# Patient Record
Sex: FEMALE
[De-identification: names, ages, dates, MRNs, and addresses within clinical notes are randomized per-mention and may not be internally consistent; named-entity substitution may affect disease eponyms.]

---

## 2021-03-30 PROBLEM — Z00.00 ENCOUNTER FOR PREVENTIVE HEALTH EXAMINATION: Status: ACTIVE | Noted: 2021-03-30

## 2021-04-02 ENCOUNTER — APPOINTMENT (OUTPATIENT)
Dept: OTOLARYNGOLOGY | Facility: CLINIC | Age: 31
End: 2021-04-02
Payer: COMMERCIAL

## 2021-04-02 VITALS
BODY MASS INDEX: 21.97 KG/M2 | WEIGHT: 124 LBS | HEIGHT: 63 IN | OXYGEN SATURATION: 98 % | DIASTOLIC BLOOD PRESSURE: 72 MMHG | SYSTOLIC BLOOD PRESSURE: 108 MMHG | HEART RATE: 76 BPM | TEMPERATURE: 97.6 F

## 2021-04-02 DIAGNOSIS — H69.83 OTHER SPECIFIED DISORDERS OF EUSTACHIAN TUBE, BILATERAL: ICD-10-CM

## 2021-04-02 DIAGNOSIS — H91.90 UNSPECIFIED HEARING LOSS, UNSPECIFIED EAR: ICD-10-CM

## 2021-04-02 PROCEDURE — 99204 OFFICE O/P NEW MOD 45 MIN: CPT | Mod: 25

## 2021-04-02 PROCEDURE — 31231 NASAL ENDOSCOPY DX: CPT

## 2021-04-02 PROCEDURE — 99072 ADDL SUPL MATRL&STAF TM PHE: CPT

## 2021-04-02 RX ORDER — FLUTICASONE PROPIONATE 50 UG/1
50 SPRAY, METERED NASAL DAILY
Qty: 2 | Refills: 1 | Status: ACTIVE | COMMUNITY
Start: 2021-04-02 | End: 1900-01-01

## 2021-04-02 NOTE — ASSESSMENT
[FreeTextEntry1] : 32 yo female who presents with left sided ear discomfort and hearing change.  On exam both TMs were retracted.  At this point I believe this is consistent with eustachian tube dysfunction.  I am recommending nasal saline and nasal steroid and follow up in 4-6 weeks for audio/tymp for further evaluation.  She will then follow up with me to review.  ETD handout given.\par \par - nasal saline\par - nasal steroids\par - audio/tymp 4-6 weeks\par - fu after the above to review

## 2021-04-02 NOTE — HISTORY OF PRESENT ILLNESS
[de-identified] : 30 yo female who presents with concern for left sided ear discomfort.  Sometimes feels like a pressure or fullness with a possible decrease/muffled earing.  This has been present on and off for several months.  No major changes in hearing otherwise, tinnitus, vertiginous symptoms, otorrhea or otalgia.  +some minor seasonal allergy and post nasal drip, that she says is actually fairly persistent.  She has not tried any treatment to this point.  No ENT issues otherwise.

## 2021-04-02 NOTE — PROCEDURE
[FreeTextEntry6] : Nasal Endoscopy\par Procedure Note\par   \par Pre-operative Diagnosis: eustachian tube dysfunction\par Post-operative Diagnosis: normal exam\par Anesthesia: Topical\par Procedure: Bilateral nasal endoscopy\par   \par Procedure Details: \par After topical anesthesia and decongestant, the patient was placed in the supine position. The telescope was passed along the left nasal floor to the nasopharynx. It was then passed into the region of the middle meatus, middle turbinate, and the sphenoethmoid region.  An identical procedure was performed on the right side. \par   \par Findings: \par Mucosa: 	                normal	\par Nasal septum: 	midline 	\par Discharge: 	none	\par Turbinates: 	normal	\par Adenoid: 	                normal	\par Posterior choanae: 	normal	\par Eustachian tubes: 	normal	\par Mucous stranding: 	normal 	\par Lesions: 	                Not present	\par   \par Comments: \par Condition: Stable. Patient tolerated procedure well.\par Complications: None\par \par

## 2021-04-02 NOTE — PHYSICAL EXAM
[Midline] : trachea located in midline position [Normal] : no rashes [de-identified] : Both TMs were retracted